# Patient Record
Sex: FEMALE | Race: WHITE | URBAN - METROPOLITAN AREA
[De-identification: names, ages, dates, MRNs, and addresses within clinical notes are randomized per-mention and may not be internally consistent; named-entity substitution may affect disease eponyms.]

---

## 2020-08-19 ENCOUNTER — OFFICE VISIT (OUTPATIENT)
Dept: URBAN - METROPOLITAN AREA CLINIC 82 | Facility: CLINIC | Age: 65
End: 2020-08-19
Payer: MEDICARE

## 2020-08-19 DIAGNOSIS — R10.816 EPIGASTRIC ABDOMINAL TENDERNESS WITHOUT REBOUND TENDERNESS: ICD-10-CM

## 2020-08-19 DIAGNOSIS — Z12.11 COLON CANCER SCREENING: ICD-10-CM

## 2020-08-19 PROCEDURE — G8427 DOCREV CUR MEDS BY ELIG CLIN: HCPCS | Performed by: INTERNAL MEDICINE

## 2020-08-19 PROCEDURE — G9903 PT SCRN TBCO ID AS NON USER: HCPCS | Performed by: INTERNAL MEDICINE

## 2020-08-19 PROCEDURE — 99204 OFFICE O/P NEW MOD 45 MIN: CPT | Performed by: INTERNAL MEDICINE

## 2020-08-19 PROCEDURE — 3017F COLORECTAL CA SCREEN DOC REV: CPT | Performed by: INTERNAL MEDICINE

## 2020-08-19 PROCEDURE — G8418 CALC BMI BLW LOW PARAM F/U: HCPCS | Performed by: INTERNAL MEDICINE

## 2020-08-19 NOTE — HPI-TODAY'S VISIT:
Patient is a very pleasant 65-year-old white female who presents today to discuss screening colonoscopy. She has not had colonoscopy.  There is no family history of colorectal cancer.  Her only bowel related complaints are occasional diarrhea when she consumes lactose.  This will occur off and on once weekly and then she may go several weeks before she has any problem. She also complains of weight gain.  She complains of a profound weakness stating that this is occurred ever since her stroke which is in 2012.  She does not know why she had a stroke she never really had any follow-up because she did not have insurance at the time.  She states that the stroke was in her frontal lobe and it caused her some higher function processing deficits. Recently her weakness has worsened.  She relates going to the mall and after walking around for a bit and getting back to her car she can barely move because she is so weak.  She is not had any evaluation of this yet. Family history is notable for cancer in her father of unknown origin diagnosed on biopsies of some mediastinal lymph nodes.

## 2020-08-19 NOTE — PHYSICAL EXAM GASTROINTESTINAL
Abdomen , soft, epigastric tenderness, no rebound, nondistended , no guarding or rigidity , no masses palpable , normal bowel sounds , Liver and Spleen , no hepatomegaly present , no hepatosplenomegaly , liver nontender , spleen not palpable

## 2021-02-10 ENCOUNTER — OFFICE VISIT (OUTPATIENT)
Dept: URBAN - METROPOLITAN AREA CLINIC 82 | Facility: CLINIC | Age: 66
End: 2021-02-10
Payer: MEDICARE

## 2021-02-10 ENCOUNTER — LAB OUTSIDE AN ENCOUNTER (OUTPATIENT)
Dept: URBAN - METROPOLITAN AREA CLINIC 82 | Facility: CLINIC | Age: 66
End: 2021-02-10

## 2021-02-10 VITALS
WEIGHT: 149.2 LBS | HEART RATE: 90 BPM | DIASTOLIC BLOOD PRESSURE: 82 MMHG | SYSTOLIC BLOOD PRESSURE: 143 MMHG | TEMPERATURE: 97.2 F | HEIGHT: 62 IN | BODY MASS INDEX: 27.46 KG/M2

## 2021-02-10 DIAGNOSIS — R19.7 INTERMITTENT DIARRHEA: ICD-10-CM

## 2021-02-10 DIAGNOSIS — R15.9 FULL INCONTINENCE OF FECES: ICD-10-CM

## 2021-02-10 DIAGNOSIS — Z12.11 COLON CANCER SCREENING: ICD-10-CM

## 2021-02-10 PROBLEM — 1086911000119107: Status: ACTIVE | Noted: 2021-02-10

## 2021-02-10 PROCEDURE — 99213 OFFICE O/P EST LOW 20 MIN: CPT | Performed by: INTERNAL MEDICINE

## 2021-02-10 PROCEDURE — G8482 FLU IMMUNIZE ORDER/ADMIN: HCPCS | Performed by: INTERNAL MEDICINE

## 2021-02-10 PROCEDURE — G8420 CALC BMI NORM PARAMETERS: HCPCS | Performed by: INTERNAL MEDICINE

## 2021-02-10 PROCEDURE — G8427 DOCREV CUR MEDS BY ELIG CLIN: HCPCS | Performed by: INTERNAL MEDICINE

## 2021-02-10 PROCEDURE — 3017F COLORECTAL CA SCREEN DOC REV: CPT | Performed by: INTERNAL MEDICINE

## 2021-02-10 NOTE — HPI-TODAY'S VISIT:
Patient is a very pleasant 66-year-old female who presents today to discuss scheduling colonoscopy. I saw her in August 19, 2020 also to discuss scheduling colonoscopy for screening purposes.  She endorsed significant weakness ever since she had a stroke in 2012.  She related that sometimes if she went to the mall and walked around for a bit and got back to her car she could barely move because of this weakness.  She also admitted that she had never had work-up for her stroke because up until last year she did not have health insurance. I was concerned by her weakness and the fact that she was not on blood thinners nor had she had work-up for her stroke.  I recommended she see a neurologist before we proceeded with colonoscopy. During her last visit she also was tender on exam in the epigastrium.  I had planned to do an EGD at the time of her colonoscopy once she had appropriate work-up of her other issues. Today she presents with new complaint.  She tells me that most days her bowels are regular but she will have intermittent diarrhea that she cannot predict.  She thinks is something in her diet but she is not sure what.  She did go lactose-free as she thought it was her milk but then reintroduced an organic milk and that seemed to help.  More recently she went out to [employer] and had chicken [place] and then had sudden urgent diarrhea with incontinence.  This occurred twice.  She is very concerned by it. She saw a neurologist.  He did an MRI.  He said there were no changes.  He did not start her on blood thinners and did not think she needed seizure prophylaxis. She is seeing a primary care doctor now.  They tried her on Metformin for diabetes however it caused explosive diarrhea and it was stopped.  She is managing with dietary changes.  She is now on atorvastatin.

## 2021-04-29 ENCOUNTER — OFFICE VISIT (OUTPATIENT)
Dept: URBAN - METROPOLITAN AREA SURGERY CENTER 13 | Facility: SURGERY CENTER | Age: 66
End: 2021-04-29
Payer: MEDICARE

## 2021-04-29 DIAGNOSIS — Z12.11 COLON CANCER SCREENING: ICD-10-CM

## 2021-04-29 PROCEDURE — G8907 PT DOC NO EVENTS ON DISCHARG: HCPCS | Performed by: INTERNAL MEDICINE

## 2021-04-29 PROCEDURE — G0121 COLON CA SCRN NOT HI RSK IND: HCPCS | Performed by: INTERNAL MEDICINE

## 2021-07-14 ENCOUNTER — OFFICE VISIT (OUTPATIENT)
Dept: URBAN - METROPOLITAN AREA CLINIC 82 | Facility: CLINIC | Age: 66
End: 2021-07-14

## 2021-10-07 ENCOUNTER — TELEPHONE ENCOUNTER (OUTPATIENT)
Dept: URBAN - METROPOLITAN AREA CLINIC 82 | Facility: CLINIC | Age: 66
End: 2021-10-07

## 2021-10-14 ENCOUNTER — TELEPHONE ENCOUNTER (OUTPATIENT)
Dept: URBAN - METROPOLITAN AREA CLINIC 82 | Facility: CLINIC | Age: 66
End: 2021-10-14

## 2021-11-03 ENCOUNTER — OFFICE VISIT (OUTPATIENT)
Dept: URBAN - METROPOLITAN AREA CLINIC 82 | Facility: CLINIC | Age: 66
End: 2021-11-03

## 2023-12-06 ENCOUNTER — OFFICE VISIT (OUTPATIENT)
Dept: URBAN - METROPOLITAN AREA CLINIC 82 | Facility: CLINIC | Age: 68
End: 2023-12-06
Payer: MEDICARE

## 2023-12-06 ENCOUNTER — LAB OUTSIDE AN ENCOUNTER (OUTPATIENT)
Dept: URBAN - METROPOLITAN AREA CLINIC 82 | Facility: CLINIC | Age: 68
End: 2023-12-06

## 2023-12-06 ENCOUNTER — DASHBOARD ENCOUNTERS (OUTPATIENT)
Age: 68
End: 2023-12-06

## 2023-12-06 VITALS
BODY MASS INDEX: 26.31 KG/M2 | DIASTOLIC BLOOD PRESSURE: 74 MMHG | SYSTOLIC BLOOD PRESSURE: 126 MMHG | TEMPERATURE: 96.9 F | WEIGHT: 143 LBS | HEART RATE: 83 BPM | HEIGHT: 62 IN

## 2023-12-06 DIAGNOSIS — K76.0 FATTY LIVER: ICD-10-CM

## 2023-12-06 DIAGNOSIS — R17 TOTAL BILIRUBIN, ELEVATED: ICD-10-CM

## 2023-12-06 PROBLEM — 197321007: Status: ACTIVE | Noted: 2023-12-06

## 2023-12-06 PROCEDURE — 99204 OFFICE O/P NEW MOD 45 MIN: CPT | Performed by: INTERNAL MEDICINE

## 2023-12-06 NOTE — HPI-TODAY'S VISIT:
67 y/o white female presents for elevated liver enzymes. Initially scheuled appt 3 months ago.  Since then lost 8 pounds ans was told   Went to hospital with acute N/V and and diarrhea.  Took Pepto then stools turned black.   Went to ER.  Got IV fluids, had labs.  LFT's were normal.  CMP noral except T. talisha 2.2 and glucose 200. CBC normal.  She has concerns about liver after watching her boyfriend die of liver related problems.  In 7/29/20 lft's were alk phs 68, AST 38, and ALT 53 and T.talisha. 1.0. Fatty liver on CT 10/14/21, described diffuse fatty liver.